# Patient Record
Sex: FEMALE | Race: AMERICAN INDIAN OR ALASKA NATIVE
[De-identification: names, ages, dates, MRNs, and addresses within clinical notes are randomized per-mention and may not be internally consistent; named-entity substitution may affect disease eponyms.]

---

## 2018-04-24 ENCOUNTER — HOSPITAL ENCOUNTER (EMERGENCY)
Dept: HOSPITAL 5 - ED | Age: 58
Discharge: HOME | End: 2018-04-24
Payer: SELF-PAY

## 2018-04-24 VITALS — DIASTOLIC BLOOD PRESSURE: 85 MMHG | SYSTOLIC BLOOD PRESSURE: 180 MMHG

## 2018-04-24 DIAGNOSIS — Z76.0: ICD-10-CM

## 2018-04-24 DIAGNOSIS — I10: Primary | ICD-10-CM

## 2018-04-24 PROCEDURE — 99282 EMERGENCY DEPT VISIT SF MDM: CPT

## 2018-04-24 NOTE — EMERGENCY DEPARTMENT REPORT
ED Recheck HPI





- General


Chief Complaint: Recheck/Abnormal Lab/Rx


Stated Complaint: HTN/MED REFILL


Time Seen by Provider: 04/24/18 20:57


Source: patient


Mode of arrival: Ambulatory


Limitations: No Limitations





- History of Present Illness


Initial Comments: 





This is a 57 y.o. female that presents for refills of blood pressure 

medication. History of HTN. Patient reports misplacing medication a few weeks 

ago and didn't f/u with PCP. Her primary care provider is at [facility]. 

She went to give plasma yesterday and told blood pressure was 189/134. She feel 

fine presently and didn't worry about getting refills. She is currently taking 

amlodipine 10 mg po daily. Denies visual changes, headache, chest pain, SOB, 

edema, or voiding changes.


MD Complaint: medication refill request


-: month(s)


Returns Today for: needs IV antibiotics


Symptoms Since Prior Visit: no new symptoms


Context: ran out of medication


Associated Symptoms: none





- Related Data


 Previous Rx's











 Medication  Instructions  Recorded  Last Taken  Type


 


Lisinopril/Hydrochlorothiazide 1 tab PO QDAY #90 tablet 10/11/13 Unknown Rx





[Zestoretic 20-25 mg]    


 


HYDROcodone/APAP 5-325 [[place] 1 - 2 each PO Q6HR PRN #14 tablet 01/22/15 

Unknown Rx





5-325 mg TAB]    


 


Hydrochlorothiazide [Hctz] 25 mg PO QDAY #30 tablet 01/22/15 Unknown Rx


 


Ibuprofen [Motrin 800 MG tab] 800 mg PO TID PRN #20 tablet 01/22/15 Unknown Rx


 


amLODIPine [Norvasc] 5 mg PO DAILY #30 tab 01/22/15 Unknown Rx


 


amLODIPine [Norvasc] 10 mg PO DAILY #30 tab 04/24/18 Unknown Rx











 Allergies











Allergy/AdvReac Type Severity Reaction Status Date / Time


 


No Known Allergies Allergy   Verified 01/22/15 12:17














ED Review of Systems


ROS: 


Stated complaint: HTN/MED REFILL


Other details as noted in HPI





Constitutional: denies: chills, fever


Respiratory: denies: cough, shortness of breath, wheezing


Cardiovascular: denies: chest pain, palpitations


Gastrointestinal: denies: abdominal pain, nausea, diarrhea


Neurological: denies: headache, weakness, paresthesias


Psychiatric: denies: anxiety, depression





ED Past Medical Hx





- Past Medical History


Hx Hypertension: Yes





- Surgical History


Additional Surgical History: csection x 2.  HERNIA REPAIR





- Social History


Smoking Status: Never Smoker


Substance Use Type: None





- Medications


Home Medications: 


 Home Medications











 Medication  Instructions  Recorded  Confirmed  Last Taken  Type


 


Lisinopril/Hydrochlorothiazide 1 tab PO QDAY #90 tablet 10/11/13 01/22/15 

Unknown Rx





[Zestoretic 20-25 mg]     


 


HYDROcodone/APAP 5-325 [[place] 1 - 2 each PO Q6HR PRN #14 tablet 01/22/15  

Unknown Rx





5-325 mg TAB]     


 


Hydrochlorothiazide [Hctz] 25 mg PO QDAY #30 tablet 01/22/15  Unknown Rx


 


Ibuprofen [Motrin 800 MG tab] 800 mg PO TID PRN #20 tablet 01/22/15  Unknown Rx


 


amLODIPine [Norvasc] 5 mg PO DAILY #30 tab 01/22/15  Unknown Rx


 


amLODIPine [Norvasc] 10 mg PO DAILY #30 tab 04/24/18  Unknown Rx














ED Physical Exam





- General


Limitations: No Limitations


General appearance: alert, in no apparent distress





- Respiratory


Respiratory exam: Present: normal lung sounds bilaterally.  Absent: respiratory 

distress





- Cardiovascular


Cardiovascular Exam: Present: regular rate, normal rhythm, normal heart sounds.

  Absent: systolic murmur, diastolic murmur, rubs, gallop





- GI/Abdominal


GI/Abdominal exam: Present: soft, normal bowel sounds.  Absent: distended, 

tenderness, guarding, rebound, rigid, organomegaly, mass





- Neurological Exam


Neurological exam: Present: alert, oriented X3, normal gait





- Psychiatric


Psychiatric exam: Present: normal affect, normal mood





- Skin


Skin exam: Present: warm, dry, intact, normal color.  Absent: rash





ED Course


 Vital Signs











  04/24/18





  18:25


 


Temperature 97.7 F


 


Pulse Rate 86


 


Respiratory 16





Rate 


 


Blood Pressure 198/90


 


O2 Sat by Pulse 96





Oximetry 














ED Recheck MDM





- Differential Diagnosis


Prescription Refill(s)





- Medical Decision Making





This is a 57 y.o. female that presents for refills for amlodipine 10 mg po 

daily. History of HTN. She lost medication a few months ago and didn't return 

to PCP for refills. Patient is asymptomatic and stable. She was examined by me. 

Given amlodipine 10 mg po once in ER. Refilled amlodipine 10 mg po daily and 

follow up with PCP in 1 week. Discussed plan with patient and agreed to plan. 

No further questions noted by the patient. Discharged home in stable condition. 

Follow up with PCP in 1 week.





Critical care attestation.: 


If time is entered above; I have spent that time in minutes in the direct care 

of this critically ill patient, excluding procedure time.








ED Disposition


Clinical Impression: 


 Hypertension, essential





Disposition: DC-01 TO HOME OR SELFCARE


Is pt being admited?: No


Does the pt Need Aspirin: No


Condition: Stable


Instructions:  Hypertension (ED)


Additional Instructions: 


Moderate caffeine consumption is acceptable.





Begin and maintain aerobic exercise, with a goal of at least 30 minutes of 

moderate intensity, dynamic aerobic exercise (walking, jogging, cycling, or 

swimming) 5 days per week to total 150 minutes as tolerated or recommended by a 

physician.





Take medication daily as prescribed.





Follow up with Primary Care Provider in 1 week.





Prescriptions: 


amLODIPine [Norvasc] 10 mg PO DAILY #30 tab


Referrals: 


[facility] [Other] - 3-5 Days


[facility] [Outside] - 3-5 Days


[facility] [Outside] - 3-5 Days


Time of Disposition: 21:15


Print Language: ENGLISH